# Patient Record
(demographics unavailable — no encounter records)

---

## 2025-01-07 NOTE — PHYSICAL EXAM
[FreeTextEntry1] : Alert, NAD. Heart sounds NL. Neck FROM. PERRL, EOMI, face symmetric, hearing intact. Tone, power, sensation, gait, DTRs NL. No nystagmus or tremor. Motor skills are suboptimal.

## 2025-01-07 NOTE — HISTORY OF PRESENT ILLNESS
[FreeTextEntry1] : 9 year old male with long hx of motor apraxia with frequent falls and motor clumsiness., Pt provided with 1 to 1 para to minimize injury as of last year. PMH +ve for concussion in 2023 from which he fully recovered. MRI brain, EEG and BSEP were NL in . Pt currently in 4th grade with OT. Pt's PCP (Dr Lomeli) referred him for PT. Pt underwent T&A last summer, and is supposed to follow up with the ENT surgeon in 2025. Birth: 37 wk GA  no complications. FMH +ve for epilepsy in a great uncle, migraine in mom and MGM. No FMH of muscle disease. PMH +ve forf asthma. Takes albuterol prn. Seasonal allergies. NKDA.

## 2025-01-07 NOTE — CONSULT LETTER
[Dear  ___] : Dear  [unfilled], [Please see my note below.] : Please see my note below. [Sincerely,] : Sincerely, [FreeTextEntry1] : This is an update on ALMA MYERS  who I saw in the office today for a follow up. This is continuing active treatment of an existing pt. [FreeTextEntry3] : Dr Benitez

## 2025-01-07 NOTE — DISCUSSION/SUMMARY
[FreeTextEntry1] : Motor apraxia. Continue OT and PT. Note given for 1 to 1 health para to minimize inadvertent injury. RTO prn. Note sent to Dr Lomeli(PCP). Total clinician time spent on 1/7/2025 is 34 minutes including preparing to see the patient, obtaining and/or reviewing and confirming history, performing a medically necessary and appropriate examination, counseling and educating the patient and/or family, documenting clinical information in the EHR and communicating and/or referring to other healthcare professionals.

## 2025-05-19 NOTE — HISTORY OF PRESENT ILLNESS
[FreeTextEntry1] : 8 y/o male here today with a bone bruise and muscle injury in the knee back in January. Patient was seen by Dr. Dai in January, has been going to PT since.